# Patient Record
Sex: FEMALE | Race: WHITE | NOT HISPANIC OR LATINO
[De-identification: names, ages, dates, MRNs, and addresses within clinical notes are randomized per-mention and may not be internally consistent; named-entity substitution may affect disease eponyms.]

---

## 2023-01-27 PROBLEM — Z00.00 ENCOUNTER FOR PREVENTIVE HEALTH EXAMINATION: Status: ACTIVE | Noted: 2023-01-27

## 2023-03-17 ENCOUNTER — APPOINTMENT (OUTPATIENT)
Dept: UROGYNECOLOGY | Facility: CLINIC | Age: 63
End: 2023-03-17
Payer: COMMERCIAL

## 2023-03-17 VITALS
HEIGHT: 62 IN | WEIGHT: 153 LBS | BODY MASS INDEX: 28.16 KG/M2 | SYSTOLIC BLOOD PRESSURE: 109 MMHG | DIASTOLIC BLOOD PRESSURE: 74 MMHG | TEMPERATURE: 98.1 F

## 2023-03-17 DIAGNOSIS — R35.1 NOCTURIA: ICD-10-CM

## 2023-03-17 DIAGNOSIS — R35.0 FREQUENCY OF MICTURITION: ICD-10-CM

## 2023-03-17 PROCEDURE — 99205 OFFICE O/P NEW HI 60 MIN: CPT

## 2023-03-17 PROCEDURE — 51798 US URINE CAPACITY MEASURE: CPT

## 2023-03-17 NOTE — LETTER BODY
[Dear  ___] : Dear ~RENE, [I had the pleasure of evaluating your patient, [unfilled]. Thank you for referring Ms. [unfilled] for consultation for ___] : I had the pleasure of evaluating your patient, [unfilled]. Thank you for referring Ms. [unfilled] for consultation for [unfilled]. [Attached please find my note.] : Attached please find my note. [Thank you very much for allowing me to participate in the care of this patient. If you have any questions, please do not hesitate to contact me] : Thank you very much for allowing me to participate in the care of this patient. If you have any questions, please do not hesitate to contact me.

## 2023-03-17 NOTE — DISCUSSION/SUMMARY
[FreeTextEntry1] : I had an extremely long discussion with Tressa today re: vLS, vulvar pain, PFD and urinary symptoms, dyspareunia, GSM. I reviewed the pathologies, possible etiologies, diagnosis, symptoms and treatment options available. Written information was given to the patient.\par \par Continue premarin as using BIW. Apply ftp amt to introitus hs x 5 nights a week (the nights not using PV). Amount and location of cream application was demonstrated to patient today in office via mirror demonstration. I explained that the cream does not work overnight and she needs to continue it for a minimum of 6-8 weeks before we re-evaluate efficacy.\par \par Clobetasol 0.05% ointment pea-size amt to areas of vLS after bath/shower QD x 6 weeks then decrease to QOD x 6 weeks then decrease to BIW. Amount and location of ointment application was demonstrated to patient today in office via mirror demonstration. I explained need for lifelong use of topical steroid for the treatment of vLS. ISSVD patient handout on vLS given to Tressa. \par \par Vulvar health techniques rev'd in detail:\par -Warm baths x15-20 mins QD with Aveeno oatmeal\par -Unscented soaps, body washes, bath gels\par -No fabric softeners or dryer sheets on underwear\par -Unscented pads (NOT ALWAYS)\par -Ice to vulva\par -Hypoallergenic lubricants\par -White Crisco as needed\par -May use Vaseline \par \par V5 suppositories PV qhs. Tressa understands that benzodiazepines are a CS, even in suppository form. She understands the potential for dependence/abuse, drug tolerance, potential for addiction. She understands that this class of drugs is habit-forming and MAGALY regulated. The sedative effects of benzos can be potentiated by taking alcohol, sleeping pills, opioids. The decision to drive is the patient's responsibility, as benzos can affect her driving ability and ability to concentrate. The goal is to wean off benzos. The patient verbalized understanding of everything we discussed and would like to continue with medication at this time. ISTOP checked. She also understands that in order to continue to get refills on her diazepam prescription, she needs to be seen in the office at minimum every 3 months.\par \par PFM relaxation techniques rev'd in detail:\par -Warm baths x15-20 mins QD with 2 cups Aveeno oatmeal\par -No pushing, straining\par -Avoid constipation\par       *Flax seed oil capsules; 2-3 capsules 2-3x/day (titrate as tolerated)\par       *Miralax\par       *Increase water intake\par -No Kegels, no squeezing\par \par Continue PFPT with [facility] weekly\par Continue dilation therapy at home.\par \par PVR = 10ml\par \par I rev'd with Tressa behavioral changes to reduce her frequency and nocturia:\par Keep total Fluids to 64oz per day\par ·6-8oz coffee\par ·No flavoring (i.e.: crystal light, sugar free added flavoring, iced tea)\par ·No seltzer or Pelligrino\par ·Drink slowly throughout the day; NO BINGE DRINKING!\par ·Stop eating and drinking 2-3 hours before bedtime (sips ok)\par \par Avoid coffee, tea, iced tea, alcohol, carbonation (soda, seltzer), spicy foods, tomatoes, hot peppers, citrus, artificial sweeteners, chocolate, soy\par \par RTO 2-3 months\par \par \par \par \par

## 2023-03-17 NOTE — HISTORY OF PRESENT ILLNESS
[FreeTextEntry1] : Tressa is a 61 yo F referred by [facility] for PFD, DYSPAREUNIA, VULVAR PAIN, AND vLS. \par \par Going to [facility] for a couple of months for pelvic and vulvar pain.\par Not able to have intercourse with her  for "years".\par 9/22 she has excision of vulvar scar band at posterior fourchette for vaginal stenosis and to confirm vLS. Path showed benign squamous mucosa with derma fibrosis, hyperkeratosis and focal parakeratosis. \par Was given clobetasol but not using it now. Used 5x/week x 4 weeks then stopped. \par No external vulvar symptoms.\par Uses Premarin PV 2x/week for the past 3 years.\par \par Pain started approximately 25 years ago.\par Had a difficult time inserting tampon.\par From first time she attempted intercourse, unable to insert her 's penis fully or without pain.\par Able to dilate with increasing size dilators. Her pain with dilation is getting progressively better. \par Tried intercourse a couple of months ago, not successful. \par \par Menopause 2011 had ALF/BSO for fibroids. \par Pain stayed the same afterwards.\par Paps wnl\par Mammograms wnl\par No recurrent vaginitis.\par \par Frequency q 1-2 hours (not commensurate with amount of fluids), nocturia x1, no hematuria, no hesitancy, feeling of complete bladder emptying, no RUTIs, rare BRIANNA (with full bladder). She does not wear pads during the day.\par She denies UTI symptoms today. \par She denies all urinary symptoms today.\par Non-smoker. \par \par +constipation has colonoscopy in May.\par \par Supraventricular tachycardia\par \par \par

## 2023-03-17 NOTE — PHYSICAL EXAM
[No Acute Distress] : in no acute distress [Well developed] : well developed [Well Nourished] : ~L well nourished [Good Hygeine] : demonstrates good hygeine [Oriented x3] : oriented to person, place, and time [Normal Memory] : ~T memory was ~L unimpaired [Normal Mood/Affect] : mood and affect are normal [Anxiety] : patient is anxious [Warm and Dry] : was warm and dry to touch [Normal Gait] : gait was normal [Vulvar Atrophy] : vulvar atrophy [Atrophy] : atrophy [No Bleeding] : there was no active vaginal bleeding [Normal] : no abnormalities [Post Void Residual ____ml] : post void residual was [unfilled] ml [Exam Deferred] : was deferred [Chaperone Present] : A chaperone was present in the examining room during all aspects of the physical examination [FreeTextEntry1] : WENDI Schafer [Tenderness] : ~T no ~M abdominal tenderness observed [Distended] : not distended [de-identified] : Q-tip touch test 3/10 @ 5,6,7 with reactive erythema at posterior fourchette, +paleness at anterior fourchette. No fissures, ulcerations, erosions [de-identified] : +hyperkeratosis b/l labia majora with hypopigmentation; +hypopigmentation and complete loss of architecture b/l labia minora; parchment paper-like texture to vulva; 90% phimosis of clitoral varma with midline fusion; hypopigmentation of perineum extending around anus. [FreeTextEntry4] : PFMs 2-3/4 (L>R) with MTrPs b/l levators, all groups and b/l OI with +taut bands and pain to palpatoin

## 2023-03-28 RX ORDER — CLOBETASOL PROPIONATE 0.5 MG/G
0.05 OINTMENT TOPICAL
Qty: 1 | Refills: 0 | Status: ACTIVE | COMMUNITY
Start: 2023-03-28 | End: 1900-01-01

## 2023-06-08 ENCOUNTER — APPOINTMENT (OUTPATIENT)
Dept: UROGYNECOLOGY | Facility: CLINIC | Age: 63
End: 2023-06-08
Payer: COMMERCIAL

## 2023-06-08 VITALS
OXYGEN SATURATION: 99 % | SYSTOLIC BLOOD PRESSURE: 108 MMHG | WEIGHT: 153 LBS | HEART RATE: 65 BPM | HEIGHT: 62 IN | DIASTOLIC BLOOD PRESSURE: 74 MMHG | BODY MASS INDEX: 28.16 KG/M2

## 2023-06-08 DIAGNOSIS — N90.4 LEUKOPLAKIA OF VULVA: ICD-10-CM

## 2023-06-08 DIAGNOSIS — M79.18 MYALGIA, OTHER SITE: ICD-10-CM

## 2023-06-08 DIAGNOSIS — R10.2 PELVIC AND PERINEAL PAIN: ICD-10-CM

## 2023-06-08 DIAGNOSIS — G89.29 PELVIC AND PERINEAL PAIN: ICD-10-CM

## 2023-06-08 DIAGNOSIS — N95.8 OTHER SPECIFIED MENOPAUSAL AND PERIMENOPAUSAL DISORDERS: ICD-10-CM

## 2023-06-08 DIAGNOSIS — N94.10 UNSPECIFIED DYSPAREUNIA: ICD-10-CM

## 2023-06-08 PROCEDURE — 99214 OFFICE O/P EST MOD 30 MIN: CPT

## 2023-06-08 NOTE — DISCUSSION/SUMMARY
[FreeTextEntry1] : d/c premarin ftp to introitus 5x/week.\par Continue premarin PV 2x/week as per GYN.\par ETG10% topical amt to introitus hs 5x/week. \par Continue V5 suppositories PV qhs.\par Continue clobetasol 0.05% ointment to areas vLS 2x/week. \par Continue PFPT with [facility] every 2-3 weeks.\par Continue IM/MFR, stretching.\par Continue dilation therapy 3-4x/week.\par \par OK to attempt intercourse. Comfort measures discussed at length:\par Prior to intercourse:\par -Dilate x 3-5 minutes\par -t/c topical lidocaine prior to attempted intercourse\par \par During intercourse:\par -Uberlube or water-based hypoallergenic lubricant\par -Limit thrusting time\par -Circular motion\par -Side-lying, female-on-top position (if F-M intercourse)\par \par After intercourse:\par -Ice\par -Apply cream to vulva\par \par RTO 2-3 months\par

## 2023-06-08 NOTE — HISTORY OF PRESENT ILLNESS
[FreeTextEntry1] : Tressa is here for a f/u visit.\par Endorses decreased pelvic and vulvar pain.\jessica Has increased dilator size to 4in circumference.\par Attempted intercourse with her  and he was able to insert approx 1/2 his penis. Still unable to achieve full penetration.\par Using clobetasol 2x/week to area of vLS. She endorses improvement in itching/burning in areas of her vLS.\par Using V5 suppositories PV qhs.\par She increased premarin to ftp to introitus 5x/week but for the past 6 weeks only using 2x/week.\par She is still going to [place] for PFPT every 2-3 weeks due to insurance restrictions.\par She reports improvement in her urinary frequency.\par She denies urinary issues today. No symptoms of UTI.\par She denies vaginal discharge, odor, itching.

## 2023-06-08 NOTE — PHYSICAL EXAM
[No Acute Distress] : in no acute distress [Well developed] : well developed [Well Nourished] : ~L well nourished [Good Hygeine] : demonstrates good hygeine [Oriented x3] : oriented to person, place, and time [Normal Memory] : ~T memory was ~L unimpaired [Normal Mood/Affect] : mood and affect are normal [Anxiety] : patient is anxious [Warm and Dry] : was warm and dry to touch [Normal Gait] : gait was normal [Vulvar Atrophy] : vulvar atrophy [Atrophy] : atrophy [No Bleeding] : there was no active vaginal bleeding [Normal] : no abnormalities [Exam Deferred] : was deferred [Tenderness] : ~T no ~M abdominal tenderness observed [Distended] : not distended [de-identified] : Q-tip touch test 3/10 @ 5,6,7 with reactive erythema at posterior fourchette, +paleness at anterior fourchette. No fissures, ulcerations, erosions [de-identified] : +hyperkeratosis b/l labia majora with hypopigmentation; +hypopigmentation and complete loss of architecture b/l labia minora; parchment paper-like texture to vulva; 90% phimosis of clitoral varma with midline fusion; hypopigmentation of perineum extending around anus. [FreeTextEntry4] : PFMs 2/4 (L>R) with MTrPs b/l L IC and b/l  OI with +taut bands and pain to palpation

## 2023-06-13 ENCOUNTER — INPATIENT (INPATIENT)
Facility: HOSPITAL | Age: 63
LOS: 1 days | Discharge: HOME | DRG: 309 | End: 2023-06-15
Attending: INTERNAL MEDICINE | Admitting: INTERNAL MEDICINE
Payer: COMMERCIAL

## 2023-06-13 VITALS
SYSTOLIC BLOOD PRESSURE: 190 MMHG | DIASTOLIC BLOOD PRESSURE: 101 MMHG | RESPIRATION RATE: 20 BRPM | OXYGEN SATURATION: 100 % | HEART RATE: 63 BPM

## 2023-06-13 DIAGNOSIS — F41.9 ANXIETY DISORDER, UNSPECIFIED: ICD-10-CM

## 2023-06-13 DIAGNOSIS — Z98.890 OTHER SPECIFIED POSTPROCEDURAL STATES: Chronic | ICD-10-CM

## 2023-06-13 DIAGNOSIS — R74.8 ABNORMAL LEVELS OF OTHER SERUM ENZYMES: ICD-10-CM

## 2023-06-13 DIAGNOSIS — Z86.2 PERSONAL HISTORY OF DISEASES OF THE BLOOD AND BLOOD-FORMING ORGANS AND CERTAIN DISORDERS INVOLVING THE IMMUNE MECHANISM: ICD-10-CM

## 2023-06-13 DIAGNOSIS — Z90.710 ACQUIRED ABSENCE OF BOTH CERVIX AND UTERUS: Chronic | ICD-10-CM

## 2023-06-13 DIAGNOSIS — I47.1 SUPRAVENTRICULAR TACHYCARDIA: ICD-10-CM

## 2023-06-13 LAB
ALBUMIN SERPL ELPH-MCNC: 4.3 G/DL — SIGNIFICANT CHANGE UP (ref 3.3–5)
ALP SERPL-CCNC: 43 U/L — SIGNIFICANT CHANGE UP (ref 40–120)
ALT FLD-CCNC: 22 U/L — SIGNIFICANT CHANGE UP (ref 10–45)
ANION GAP SERPL CALC-SCNC: 13 MMOL/L — SIGNIFICANT CHANGE UP (ref 5–17)
ANISOCYTOSIS BLD QL: SLIGHT — SIGNIFICANT CHANGE UP
AST SERPL-CCNC: 28 U/L — SIGNIFICANT CHANGE UP (ref 10–40)
BASOPHILS # BLD AUTO: 0 K/UL — SIGNIFICANT CHANGE UP (ref 0–0.2)
BASOPHILS NFR BLD AUTO: 0 % — SIGNIFICANT CHANGE UP (ref 0–2)
BILIRUB SERPL-MCNC: 0.7 MG/DL — SIGNIFICANT CHANGE UP (ref 0.2–1.2)
BUN SERPL-MCNC: 18 MG/DL — SIGNIFICANT CHANGE UP (ref 7–23)
CALCIUM SERPL-MCNC: 8.8 MG/DL — SIGNIFICANT CHANGE UP (ref 8.4–10.5)
CHLORIDE SERPL-SCNC: 99 MMOL/L — SIGNIFICANT CHANGE UP (ref 96–108)
CK MB CFR SERPL CALC: 3.5 NG/ML — SIGNIFICANT CHANGE UP (ref 0–3.8)
CK SERPL-CCNC: 64 U/L — SIGNIFICANT CHANGE UP (ref 25–170)
CO2 SERPL-SCNC: 22 MMOL/L — SIGNIFICANT CHANGE UP (ref 22–31)
CREAT SERPL-MCNC: 1.06 MG/DL — SIGNIFICANT CHANGE UP (ref 0.5–1.3)
EGFR: 59 ML/MIN/1.73M2 — LOW
ELLIPTOCYTES BLD QL SMEAR: SLIGHT — SIGNIFICANT CHANGE UP
EOSINOPHIL # BLD AUTO: 0 K/UL — SIGNIFICANT CHANGE UP (ref 0–0.5)
EOSINOPHIL NFR BLD AUTO: 0 % — SIGNIFICANT CHANGE UP (ref 0–6)
GLUCOSE SERPL-MCNC: 94 MG/DL — SIGNIFICANT CHANGE UP (ref 70–99)
HCT VFR BLD CALC: 31.2 % — LOW (ref 34.5–45)
HGB BLD-MCNC: 10.1 G/DL — LOW (ref 11.5–15.5)
HYPOCHROMIA BLD QL: SLIGHT — SIGNIFICANT CHANGE UP
LYMPHOCYTES # BLD AUTO: 2.23 K/UL — SIGNIFICANT CHANGE UP (ref 1–3.3)
LYMPHOCYTES # BLD AUTO: 35.7 % — SIGNIFICANT CHANGE UP (ref 13–44)
MACROCYTES BLD QL: SLIGHT — SIGNIFICANT CHANGE UP
MANUAL SMEAR VERIFICATION: SIGNIFICANT CHANGE UP
MCHC RBC-ENTMCNC: 21.1 PG — LOW (ref 27–34)
MCHC RBC-ENTMCNC: 32.4 GM/DL — SIGNIFICANT CHANGE UP (ref 32–36)
MCV RBC AUTO: 65.1 FL — LOW (ref 80–100)
MICROCYTES BLD QL: SIGNIFICANT CHANGE UP
MONOCYTES # BLD AUTO: 0.16 K/UL — SIGNIFICANT CHANGE UP (ref 0–0.9)
MONOCYTES NFR BLD AUTO: 2.6 % — SIGNIFICANT CHANGE UP (ref 2–14)
NEUTROPHILS # BLD AUTO: 3.86 K/UL — SIGNIFICANT CHANGE UP (ref 1.8–7.4)
NEUTROPHILS NFR BLD AUTO: 61.7 % — SIGNIFICANT CHANGE UP (ref 43–77)
NT-PROBNP SERPL-SCNC: 166 PG/ML — SIGNIFICANT CHANGE UP (ref 0–300)
PLAT MORPH BLD: NORMAL — SIGNIFICANT CHANGE UP
PLATELET # BLD AUTO: 275 K/UL — SIGNIFICANT CHANGE UP (ref 150–400)
POLYCHROMASIA BLD QL SMEAR: SLIGHT — SIGNIFICANT CHANGE UP
POTASSIUM SERPL-MCNC: 4.3 MMOL/L — SIGNIFICANT CHANGE UP (ref 3.5–5.3)
POTASSIUM SERPL-SCNC: 4.3 MMOL/L — SIGNIFICANT CHANGE UP (ref 3.5–5.3)
PROT SERPL-MCNC: 6.3 G/DL — SIGNIFICANT CHANGE UP (ref 6–8.3)
RBC # BLD: 4.79 M/UL — SIGNIFICANT CHANGE UP (ref 3.8–5.2)
RBC # FLD: 16.5 % — HIGH (ref 10.3–14.5)
RBC BLD AUTO: ABNORMAL
SCHISTOCYTES BLD QL AUTO: SLIGHT — SIGNIFICANT CHANGE UP
SODIUM SERPL-SCNC: 134 MMOL/L — LOW (ref 135–145)
TARGETS BLD QL SMEAR: SLIGHT — SIGNIFICANT CHANGE UP
TROPONIN T, HIGH SENSITIVITY RESULT: 122 NG/L — HIGH (ref 0–51)
TROPONIN T, HIGH SENSITIVITY RESULT: 16 NG/L — SIGNIFICANT CHANGE UP (ref 0–51)
TROPONIN T, HIGH SENSITIVITY RESULT: 72 NG/L — HIGH (ref 0–51)
WBC # BLD: 6.26 K/UL — SIGNIFICANT CHANGE UP (ref 3.8–10.5)
WBC # FLD AUTO: 6.26 K/UL — SIGNIFICANT CHANGE UP (ref 3.8–10.5)

## 2023-06-13 PROCEDURE — 71045 X-RAY EXAM CHEST 1 VIEW: CPT | Mod: 26

## 2023-06-13 PROCEDURE — 99223 1ST HOSP IP/OBS HIGH 75: CPT

## 2023-06-13 PROCEDURE — 93308 TTE F-UP OR LMTD: CPT | Mod: 26

## 2023-06-13 RX ORDER — ONDANSETRON 8 MG/1
4 TABLET, FILM COATED ORAL EVERY 8 HOURS
Refills: 0 | Status: DISCONTINUED | OUTPATIENT
Start: 2023-06-13 | End: 2023-06-15

## 2023-06-13 RX ORDER — ARIPIPRAZOLE 15 MG/1
10 TABLET ORAL DAILY
Refills: 0 | Status: DISCONTINUED | OUTPATIENT
Start: 2023-06-13 | End: 2023-06-15

## 2023-06-13 RX ORDER — LANOLIN ALCOHOL/MO/W.PET/CERES
3 CREAM (GRAM) TOPICAL AT BEDTIME
Refills: 0 | Status: DISCONTINUED | OUTPATIENT
Start: 2023-06-13 | End: 2023-06-15

## 2023-06-13 RX ORDER — ACETAMINOPHEN 500 MG
650 TABLET ORAL EVERY 6 HOURS
Refills: 0 | Status: DISCONTINUED | OUTPATIENT
Start: 2023-06-13 | End: 2023-06-15

## 2023-06-13 RX ORDER — CITALOPRAM 10 MG/1
40 TABLET, FILM COATED ORAL DAILY
Refills: 0 | Status: DISCONTINUED | OUTPATIENT
Start: 2023-06-13 | End: 2023-06-15

## 2023-06-13 RX ORDER — CLONAZEPAM 1 MG
0.5 TABLET ORAL
Refills: 0 | Status: DISCONTINUED | OUTPATIENT
Start: 2023-06-13 | End: 2023-06-15

## 2023-06-13 NOTE — H&P ADULT - NSHPLABSRESULTS_GEN_ALL_CORE
Labs personally reviewed:                          10.1   6.26  )-----------( 275      ( 13 Jun 2023 16:57 )             31.2     06-13    134<L>  |  99  |  18  ----------------------------<  94  4.3   |  22  |  1.06    Ca    8.8      13 Jun 2023 16:57    TPro  6.3  /  Alb  4.3  /  TBili  0.7  /  DBili  x   /  AST  28  /  ALT  22  /  AlkPhos  43  06-13        LIVER FUNCTIONS - ( 13 Jun 2023 16:57 )  Alb: 4.3 g/dL / Pro: 6.3 g/dL / ALK PHOS: 43 U/L / ALT: 22 U/L / AST: 28 U/L / GGT: x               CAPILLARY BLOOD GLUCOSE          Imaging:  CXR personally reviewed: no focal opacity    EKG personally reviewed: nsr at 79 bpm , no acute st changes Labs personally reviewed:                          10.1   6.26  )-----------( 275      ( 13 Jun 2023 16:57 )             31.2     06-13    134<L>  |  99  |  18  ----------------------------<  94  4.3   |  22  |  1.06    Ca    8.8      13 Jun 2023 16:57    TPro  6.3  /  Alb  4.3  /  TBili  0.7  /  DBili  x   /  AST  28  /  ALT  22  /  AlkPhos  43  06-13        LIVER FUNCTIONS - ( 13 Jun 2023 16:57 )  Alb: 4.3 g/dL / Pro: 6.3 g/dL / ALK PHOS: 43 U/L / ALT: 22 U/L / AST: 28 U/L / GGT: x               CAPILLARY BLOOD GLUCOSE          Imaging:  CXR personally reviewed: no focal opacity    EKG personally reviewed: nsr at 79 bpm , no acute st changes, qtc 431

## 2023-06-13 NOTE — PATIENT PROFILE ADULT - ...
Condom Catheter removed and Henson Catheter inserted without difficulty using sterile technique. Urinary catheter is patent and flowing. Patient tolerated well.   Continue to monitor 13-Jun-2023 22:17:05

## 2023-06-13 NOTE — ED PROVIDER NOTE - PHYSICAL EXAMINATION
On exam patient is well-appearing, skin warm and well perfused, no bilateral lower extremity edema, heart rate regular, clear lungs.

## 2023-06-13 NOTE — H&P ADULT - PROBLEM SELECTOR PLAN 1
initially w/ hemodynamic instability , now improved , currently in sinus , hemodynamically stable, mild troponin elevated , more likely 2/2 increased demand rather than ischemic event    - telemetry   - TTE   - TSH   - check orthostatic vitals   - f/u cardiology consult   - EP consult -to be arranged by day team

## 2023-06-13 NOTE — ED PROVIDER NOTE - OBJECTIVE STATEMENT
Patient is a 62-year-old female past medical history of SVT with as needed propanolol, presenting with palpitations and near syncope.  Patient states she was sitting at her desk at work and she was texting her  when she started to have palpitations that felt like her SVT, she then started to feel faint and took 20 of propanolol which did not help, she then took another 20 of propanolol which again did not help.She then called her doctor's office and was told to take 1 more propanolol.   EMS then came and found her with a blood pressure of 70/40, blood pressure did not resolve after 500 mL of fluid, so they called for medical advice and was told to start a Levo drip, by the time she came to the ER her blood pressure was very high.  Levo was stopped in ED.  Blood pressure was then 90/60.  Patient symptoms have resolved on arrival, with heart rate now in the 60s.  Patient notes she had some shortness of breath during the incident, felt faint, and had some chest pain on arrival to the ED.  Denies recent illness, fevers, weakness, numbness, syncope.

## 2023-06-13 NOTE — H&P ADULT - NSHPPHYSICALEXAM_GEN_ALL_CORE
Vital Signs Last 24 Hrs  T(C): 37 (13 Jun 2023 18:15), Max: 37 (13 Jun 2023 18:15)  T(F): 98.6 (13 Jun 2023 18:15), Max: 98.6 (13 Jun 2023 18:15)  HR: 54 (13 Jun 2023 20:44) (54 - 80)  BP: 107/73 (13 Jun 2023 20:44) (87/58 - 190/101)  BP(mean): 71 (13 Jun 2023 17:15) (71 - 71)  RR: 16 (13 Jun 2023 20:44) (16 - 20)  SpO2: 99% (13 Jun 2023 20:44) (98% - 100%)    Parameters below as of 13 Jun 2023 20:44  Patient On (Oxygen Delivery Method): room air Vital Signs Last 24 Hrs  T(C): 37 (13 Jun 2023 18:15), Max: 37 (13 Jun 2023 18:15)  T(F): 98.6 (13 Jun 2023 18:15), Max: 98.6 (13 Jun 2023 18:15)  HR: 54 (13 Jun 2023 20:44) (54 - 80)  BP: 107/73 (13 Jun 2023 20:44) (87/58 - 190/101)  BP(mean): 71 (13 Jun 2023 17:15) (71 - 71)  RR: 16 (13 Jun 2023 20:44) (16 - 20)  SpO2: 99% (13 Jun 2023 20:44) (98% - 100%)    Parameters below as of 13 Jun 2023 20:44  Patient On (Oxygen Delivery Method): room air    GENERAL: No acute distress, well-developed  HEAD:  Atraumatic, Normocephalic  ENT: EOMI, PERRLA, conjunctiva and sclera clear,  moist mucosa no pharyngeal erythema or exudates   NECK: supple , no JVD   CHEST/LUNG: Clear to auscultation bilaterally; No wheeze, equal breath sounds bilaterally   BACK: No spinal tenderness,  No CVA tenderness   HEART: Regular rate and rhythm; No murmurs, rubs, or gallops  ABDOMEN: Soft, Nontender, Nondistended; Bowel sounds present  EXTREMITIES:  No clubbing, cyanosis, or edema  MSK: No joint swelling or effusions, ROM intact   PSYCH: Normal behavior/affect  NEUROLOGY: AAOx3, non-focal, cranial nerves intact  SKIN: Normal color, No rashes or lesions

## 2023-06-13 NOTE — ED PROVIDER NOTE - PROGRESS NOTE DETAILS
Joleen Glover MD PGY2: Spoke with Dr Moreno. Effusion is new. Will admit to [facility] for cardiology work-up. Patient currently hemodynamically stable. [facility] to see.

## 2023-06-13 NOTE — ED ADULT NURSE NOTE - NSFALLUNIVINTERV_ED_ALL_ED
Bed/Stretcher in lowest position, wheels locked, appropriate side rails in place/Call bell, personal items and telephone in reach/Instruct patient to call for assistance before getting out of bed/chair/stretcher/Non-slip footwear applied when patient is off stretcher/[place] to call system/Physically safe environment - no spills, clutter or unnecessary equipment/Purposeful proactive rounding/Room/bathroom lighting operational, light cord in reach

## 2023-06-13 NOTE — CONSULT NOTE ADULT - SUBJECTIVE AND OBJECTIVE BOX
DATE OF SERVICE: 06-13-23     CHIEF COMPLAINT:Patient is a 62y old  Female who presents with a chief complaint of palpitations x 1 day (13 Jun 2023 20:50)      HISTORY OF PRESENT ILLNESS:HPI:  Patient is a 62-year-old female with a past medical history of SVT  on propranolol as needed ,  anxiety and depression , thalassemia minor, presenting for  palpitations and lightheadedness for one day.   Patient sudden onset palpitations while at work on day of admission , symptoms were associated with lightheadedness and feeling as if she was going to pass out, she also reports some shortness of breath and chest pain , she took of 20mg of propranolol three times with minimal response.  Patient was found to be hypotensive upon EMS arrival and was treated with IV fluid bolus . She was placed transiently on Levophed with improvement of symptoms. Patient reports no recent illness , no fever or chills. no recent travel , no sick contacts, no lower extremity edema. Sympotms have since resolved and patient has no specific complaints at this time.    (13 Jun 2023 20:50)      PAST MEDICAL & SURGICAL HISTORY:  Supraventricular tachycardia      Anxiety and depression      H/O thalassemia minor      S/P hysterectomy      H/O knee surgery              MEDICATIONS:        acetaminophen     Tablet .. 650 milliGRAM(s) Oral every 6 hours PRN  ARIPiprazole 10 milliGRAM(s) Oral daily  citalopram 40 milliGRAM(s) Oral daily  clonazePAM  Tablet 0.5 milliGRAM(s) Oral two times a day PRN  melatonin 3 milliGRAM(s) Oral at bedtime PRN  ondansetron Injectable 4 milliGRAM(s) IV Push every 8 hours PRN    aluminum hydroxide/magnesium hydroxide/simethicone Suspension 30 milliLiter(s) Oral every 4 hours PRN          FAMILY HISTORY:  Family history of angina (Grandparent)        Non-contributory    SOCIAL HISTORY:    [ ] not a smoker    Allergies    latex (Unknown)  No Known Drug Allergies    Intolerances    	    REVIEW OF SYSTEMS:  CONSTITUTIONAL: No fever  EYES: No eye pain, visual disturbances, or discharge  ENMT:  No difficulty hearing, tinnitus  NECK: No pain or stiffness  RESPIRATORY: No cough, wheezing,  CARDIOVASCULAR: No chest pain, + palpitations, no passing out, dizziness, or leg swelling  GASTROINTESTINAL:  No nausea, vomiting, diarrhea or constipation. No melena.  GENITOURINARY: No dysuria, hematuria  NEUROLOGICAL: No stroke like symptoms  SKIN: No burning or lesions   ENDOCRINE: No heat or cold intolerance  MUSCULOSKELETAL: No joint pain or swelling  PSYCHIATRIC: No  anxiety, mood swings  HEME/LYMPH: No bleeding gums  ALLERGY AND IMMUNOLOGIC: No hives or eczema	    All other ROS negative    PHYSICAL EXAM:  T(C): 37 (06-13-23 @ 18:15), Max: 37 (06-13-23 @ 18:15)  HR: 69 (06-13-23 @ 21:23) (54 - 80)  BP: 115/75 (06-13-23 @ 21:23) (87/58 - 190/101)  RR: 17 (06-13-23 @ 21:23) (16 - 20)  SpO2: 100% (06-13-23 @ 21:23) (98% - 100%)  Wt(kg): --  I&O's Summary      Appearance: Normal	  HEENT:   Normal oral mucosa, EOMI	  Cardiovascular:  S1 S2, No JVD,    Respiratory: Lungs clear to auscultation	  Psychiatry: Alert  Gastrointestinal:  Soft, Non-tender, + BS	  Skin: No rashes   Neurologic: Non-focal  Extremities:  No edema  Vascular: Peripheral pulses palpable    	    	  	  CARDIAC MARKERS:  Labs personally reviewed by me                                  10.1   6.26  )-----------( 275      ( 13 Jun 2023 16:57 )             31.2     06-13    134<L>  |  99  |  18  ----------------------------<  94  4.3   |  22  |  1.06    Ca    8.8      13 Jun 2023 16:57    TPro  6.3  /  Alb  4.3  /  TBili  0.7  /  DBili  x   /  AST  28  /  ALT  22  /  AlkPhos  43  06-13          EKG: Personally reviewed by me - NSR  Radiology: Personally reviewed by me - CXR clear lungs      Assessment:  · Assessment	  62 F w/pmh SVT, anxiety and depression , thalassemia minor, p/w palpitations x 1 day         Problem/Plan - 1:  ·  Problem: Sustained SVT.   ·  Plan: initially w/ hemodynamic instability , now improved , currently in sinus , hemodynamically stable,    - telemetry   - TTE   - TSH   - check orthostatic vitals   - OP follow with EP for ablation eval    Problem/Plan - 2:  ·  Problem: Elevated troponin  ·  Plan: Likely increased demand in setting of SVT , currently asymptomatic , ekg w/ no acute ischemic changes   - telemetry   - as1/ lipid   - CT angio heart to define anatomy    Problem/Plan - 3:  ·  Problem: Anxiety and depression.   ·  Plan: - continue Celexa   - continue Abilify   - continue Klonopin prn.    Problem/Plan - 4:  ·  Problem: H/O thalassemia minor.   ·  Plan: hgb 10 , at baseline per patient   - monitor H/H.                Differential diagnosis and plan of care discussed with patient after the evaluation. Counseling on diet, nutritional counseling, weight management, exercise and medication compliance was done.   Advanced care planning/advanced directives discussed with patient/family. DNR status including forceful chest compressions to attempt to restart the heart, ventilator support/artificial breathing, electric shock, artificial nutrition, health care proxy, Molst form all discussed with pt. Pt wishes to consider. More than fifteen minutes spent on discussing advanced directives. One hundred ten minutes spent on encounter, of which more than fifty percent of the encounter was spent on counseling and/or coordinating care by the attending physician.        Antoine Noriega DO [facility]  Cardiovascular Medicine  [address] [place] [address], [address]  Office [phone]  Available via call/text on [employer] Teams

## 2023-06-13 NOTE — ED PROVIDER NOTE - ATTENDING CONTRIBUTION TO CARE
Attending MD Fregoso:  I personally have seen and examined this patient. I have performed a substantive portion of the visit including all aspects of the medical decision making.  Resident note reviewed and agree on plan of care and except where noted.      62-year-old woman with a history of SVT intermittently taking propranolol as needed for symptoms presenting with EMS for palpitations near syncope and hypotension.  Reportedly patient had blood pressure rené of 70 systolic.  The patient was given 500 cc of fluid and then started on a norepinephrine drip briefly.  At this time she feels overall better her heart rate is in the 70s, norepinephrine was stopped and blood pressure was 80s to 90s systolic.  Denies any chest pain or abdominal pain.  No preceding illnesses.    Patient sitting in the stretcher in no apparent distress.  No increased work of breathing.  Regular heart sounds clear lungs anteriorly.  Extremities warm and well perfused peripheral pulses palpable no appreciable edema.    ED POCUS reveals small to moderate pericardial effusion.  No appreciable right atrial systolic collapse, no appreciable right ventricular diastolic collapse.  The IVC is plethoric with minimal respiratory variation however.  Unable to obtain adequate images in the apical windows for mitral inflow variation calculation.  Overall no overt signs of cardiac tamponade on echo.    Patient presenting for evaluation of palpitations and near syncope with hypotension.  History of SVT.  Patient is not in SVT currently.  Possibly new onset small to moderate pericardial effusion of uncertain significance.  Given presenting hypotension however, will need comprehensive echo telemetry monitoring cardiology consultation.  ED POCUS echo is without obvious tamponade physiology at this time.            *The above represents an initial assessment/impression. Please refer to progress notes for potential changes in patient clinical course*

## 2023-06-13 NOTE — H&P ADULT - NSICDXFAMILYHX_GEN_ALL_CORE_FT
FAMILY HISTORY:  Grandparent  Still living? Unknown  Family history of angina, Age at diagnosis: Age Unknown

## 2023-06-13 NOTE — ED PROVIDER NOTE - CLINICAL SUMMARY MEDICAL DECISION MAKING FREE TEXT BOX
Patient is a 62-year-old female past medical history of SVT with as needed propanolol, presenting with palpitations and near syncope. Bedside echo, patient with small to moderate pericardial effusion, and plethoric IVC. Initial EKG by EMS shows SVT at rate 150. No bilateral lower extremity edema.  Patient appears comfortable.  Vital stable at this time will place on cardiac monitor.  We will get chest x-ray, Trope, BNP, labs.  Patient will need to stay in hospital for cardiac evaluation.  We will try to call Dr. Moreno patient's cardiologist for further information.  Patient is unaware if the pericardial effusion is old or new.  Has never had an ablation for the SVT.

## 2023-06-13 NOTE — H&P ADULT - NSHPREVIEWOFSYSTEMS_GEN_ALL_CORE
CONSTITUTIONAL: +  weakness,no  fevers or chills  EYES/ENT: No visual changes;  No dysphagia  NECK: No pain or stiffness  RESPIRATORY: No cough, wheezing, hemoptysis; +  shortness of breath  CARDIOVASCULAR: +  chest pain + palpitations; No lower extremity edema  EXTREMITIES: no le edema, cyanosis, clubbing  GASTROINTESTINAL: No abdominal or epigastric pain. No nausea, vomiting, or hematemesis; No diarrhea or constipation. No melena or hematochezia.  BACK: No back pain  GENITOURINARY: No dysuria, frequency or hematuria  NEUROLOGICAL: No numbness or weakness  MSK: no joint swelling or pain  SKIN: No itching, burning, rashes, or lesions   PSYCH: no agitation  All other review of systems is negative unless indicated above.

## 2023-06-13 NOTE — H&P ADULT - HISTORY OF PRESENT ILLNESS
Patient is a 62-year-old female with a past medical history of SVT  on propranolol as needed ,  presenting for  palpitations and lightheadedness for one day.   Patient sudden onset palpitations while at work on day of admission , symptoms were associated with lightheadedness and feeling as if she was going to pass out, she also reports some shortness of breath and chest pain , she took of 20mg of propranolol three times with minimal response.  Patient was found to be hypotensive upon EMS arrival and was treated with IV fluid bolus . She was placed transiently on Levophed with improvement of symptoms. Patient reports no recent illness , no fever or chills.    Patient is a 62-year-old female with a past medical history of SVT  on propranolol as needed ,  anxiety and depression , thalassemia minor, presenting for  palpitations and lightheadedness for one day.   Patient sudden onset palpitations while at work on day of admission , symptoms were associated with lightheadedness and feeling as if she was going to pass out, she also reports some shortness of breath and chest pain , she took of 20mg of propranolol three times with minimal response.  Patient was found to be hypotensive upon EMS arrival and was treated with IV fluid bolus . She was placed transiently on Levophed with improvement of symptoms. Patient reports no recent illness , no fever or chills. no recent travel , no sick contacts, no lower extremity edema. Sympotms have since resolved and patient has no specific complaints at this time.

## 2023-06-13 NOTE — ED ADULT NURSE NOTE - OBJECTIVE STATEMENT
Patient is a 62 year old female brought in by EMS from work due to palpitations and near syncope. EMS reports patient was found to have a blood pressure of 75/50 so they gave her NS 500cc bolus and then started a levo drip which they stopped on arrival to the ED. Patient reports sitting at her desk at work and she was texting her  when she started to have palpitations that felt like her SVT, she then started to feel faint and took 20mg of propanolol which did not help. Patient then took another 2 of propanolol in total 60mg. On assessment A&Ox1, ambulates independently, breathing comfortably on room air, NSR on the cardiac monitor, no JVD, no edema noted, abdomen is soft, nondistended, nontender. Patient denies headache, dizziness, chest pain, palpitations, cough, SOB, abdominal pain, n/v/d, urinary symptoms, fevers, chills, weakness at this time. PMH anxiety, SVT.

## 2023-06-13 NOTE — H&P ADULT - PROBLEM SELECTOR PLAN 2
increased demand in setting of SVT , currently asymptomatic , ekg w/ no acute ischemic changes   - telemetry   - as1/ lipid   - trend cardiac enzymes until peak   - f/u cardiology consult

## 2023-06-13 NOTE — H&P ADULT - NSICDXPASTMEDICALHX_GEN_ALL_CORE_FT
PAST MEDICAL HISTORY:  Anxiety and depression     H/O thalassemia minor     Supraventricular tachycardia

## 2023-06-14 LAB
A1C WITH ESTIMATED AVERAGE GLUCOSE RESULT: 4.8 % — SIGNIFICANT CHANGE UP (ref 4–5.6)
ALBUMIN SERPL ELPH-MCNC: 4.1 G/DL — SIGNIFICANT CHANGE UP (ref 3.3–5)
ALP SERPL-CCNC: 39 U/L — LOW (ref 40–120)
ALT FLD-CCNC: 20 U/L — SIGNIFICANT CHANGE UP (ref 10–45)
ANION GAP SERPL CALC-SCNC: 13 MMOL/L — SIGNIFICANT CHANGE UP (ref 5–17)
AST SERPL-CCNC: 20 U/L — SIGNIFICANT CHANGE UP (ref 10–40)
BASOPHILS # BLD AUTO: 0.04 K/UL — SIGNIFICANT CHANGE UP (ref 0–0.2)
BASOPHILS NFR BLD AUTO: 0.7 % — SIGNIFICANT CHANGE UP (ref 0–2)
BILIRUB SERPL-MCNC: 0.4 MG/DL — SIGNIFICANT CHANGE UP (ref 0.2–1.2)
BUN SERPL-MCNC: 18 MG/DL — SIGNIFICANT CHANGE UP (ref 7–23)
CALCIUM SERPL-MCNC: 9 MG/DL — SIGNIFICANT CHANGE UP (ref 8.4–10.5)
CHLORIDE SERPL-SCNC: 103 MMOL/L — SIGNIFICANT CHANGE UP (ref 96–108)
CHOLEST SERPL-MCNC: 102 MG/DL — SIGNIFICANT CHANGE UP
CO2 SERPL-SCNC: 24 MMOL/L — SIGNIFICANT CHANGE UP (ref 22–31)
CREAT SERPL-MCNC: 0.88 MG/DL — SIGNIFICANT CHANGE UP (ref 0.5–1.3)
EGFR: 74 ML/MIN/1.73M2 — SIGNIFICANT CHANGE UP
EOSINOPHIL # BLD AUTO: 0.1 K/UL — SIGNIFICANT CHANGE UP (ref 0–0.5)
EOSINOPHIL NFR BLD AUTO: 1.9 % — SIGNIFICANT CHANGE UP (ref 0–6)
ESTIMATED AVERAGE GLUCOSE: 91 MG/DL — SIGNIFICANT CHANGE UP (ref 68–114)
GLUCOSE SERPL-MCNC: 87 MG/DL — SIGNIFICANT CHANGE UP (ref 70–99)
HCT VFR BLD CALC: 30.3 % — LOW (ref 34.5–45)
HCV AB S/CO SERPL IA: 0.09 S/CO — SIGNIFICANT CHANGE UP (ref 0–0.99)
HCV AB SERPL-IMP: SIGNIFICANT CHANGE UP
HDLC SERPL-MCNC: 38 MG/DL — LOW
HGB BLD-MCNC: 9.9 G/DL — LOW (ref 11.5–15.5)
IMM GRANULOCYTES NFR BLD AUTO: 0.2 % — SIGNIFICANT CHANGE UP (ref 0–0.9)
LIPID PNL WITH DIRECT LDL SERPL: 53 MG/DL — SIGNIFICANT CHANGE UP
LYMPHOCYTES # BLD AUTO: 1.8 K/UL — SIGNIFICANT CHANGE UP (ref 1–3.3)
LYMPHOCYTES # BLD AUTO: 33.7 % — SIGNIFICANT CHANGE UP (ref 13–44)
MCHC RBC-ENTMCNC: 21.2 PG — LOW (ref 27–34)
MCHC RBC-ENTMCNC: 32.7 GM/DL — SIGNIFICANT CHANGE UP (ref 32–36)
MCV RBC AUTO: 64.9 FL — LOW (ref 80–100)
MONOCYTES # BLD AUTO: 0.46 K/UL — SIGNIFICANT CHANGE UP (ref 0–0.9)
MONOCYTES NFR BLD AUTO: 8.6 % — SIGNIFICANT CHANGE UP (ref 2–14)
MRSA PCR RESULT.: SIGNIFICANT CHANGE UP
NEUTROPHILS # BLD AUTO: 2.93 K/UL — SIGNIFICANT CHANGE UP (ref 1.8–7.4)
NEUTROPHILS NFR BLD AUTO: 54.9 % — SIGNIFICANT CHANGE UP (ref 43–77)
NON HDL CHOLESTEROL: 64 MG/DL — SIGNIFICANT CHANGE UP
NRBC # BLD: 0 /100 WBCS — SIGNIFICANT CHANGE UP (ref 0–0)
PLATELET # BLD AUTO: 239 K/UL — SIGNIFICANT CHANGE UP (ref 150–400)
POTASSIUM SERPL-MCNC: 4 MMOL/L — SIGNIFICANT CHANGE UP (ref 3.5–5.3)
POTASSIUM SERPL-SCNC: 4 MMOL/L — SIGNIFICANT CHANGE UP (ref 3.5–5.3)
PROT SERPL-MCNC: 6.1 G/DL — SIGNIFICANT CHANGE UP (ref 6–8.3)
RBC # BLD: 4.67 M/UL — SIGNIFICANT CHANGE UP (ref 3.8–5.2)
RBC # FLD: 16.5 % — HIGH (ref 10.3–14.5)
S AUREUS DNA NOSE QL NAA+PROBE: SIGNIFICANT CHANGE UP
SODIUM SERPL-SCNC: 140 MMOL/L — SIGNIFICANT CHANGE UP (ref 135–145)
TRIGL SERPL-MCNC: 56 MG/DL — SIGNIFICANT CHANGE UP
TROPONIN T, HIGH SENSITIVITY RESULT: 79 NG/L — HIGH (ref 0–51)
TSH SERPL-MCNC: 5.27 UIU/ML — HIGH (ref 0.27–4.2)
WBC # BLD: 5.34 K/UL — SIGNIFICANT CHANGE UP (ref 3.8–10.5)
WBC # FLD AUTO: 5.34 K/UL — SIGNIFICANT CHANGE UP (ref 3.8–10.5)

## 2023-06-14 PROCEDURE — 93356 MYOCRD STRAIN IMG SPCKL TRCK: CPT

## 2023-06-14 PROCEDURE — 93306 TTE W/DOPPLER COMPLETE: CPT | Mod: 26

## 2023-06-14 RX ORDER — CHLORHEXIDINE GLUCONATE 213 G/1000ML
1 SOLUTION TOPICAL
Refills: 0 | Status: DISCONTINUED | OUTPATIENT
Start: 2023-06-14 | End: 2023-06-15

## 2023-06-14 RX ADMIN — CITALOPRAM 40 MILLIGRAM(S): 10 TABLET, FILM COATED ORAL at 12:24

## 2023-06-14 RX ADMIN — Medication 0.5 MILLIGRAM(S): at 22:04

## 2023-06-14 RX ADMIN — ARIPIPRAZOLE 10 MILLIGRAM(S): 15 TABLET ORAL at 12:25

## 2023-06-14 NOTE — PROGRESS NOTE ADULT - NS ATTEND AMEND GEN_ALL_CORE FT
Patient care and plan discussed and reviewed with Advanced Care Provider. Plan as outlined above edited by me to reflect our discussion. I had a prolonged conversation with the patient/son regarding hospital course, differential diagnosis and results of diagnostic tests.  Plan of care discussed with patient/son after the evaluation. Patient/family express clear understanding and satisfaction with the plan of care.  Sixty five minutes spent on encounter, of which more than fifty percent of the encounter was spent on counseling and/or coordinating care by the attending physician.
Pt care and plan discussed and reviewed with PA. Plan as outlined above edited by me to reflect our discussion. Advanced care planning/advanced directives discussed with patient/family. DNR status including forceful chest compressions to attempt to restart the heart, ventilator support/artificial breathing, electric shock, artificial nutrition, health care proxy, Molst form all discussed with pt.

## 2023-06-14 NOTE — PROGRESS NOTE ADULT - SUBJECTIVE AND OBJECTIVE BOX
DATE OF SERVICE: 06-14-23 @ 13:16    Patient is a 62y old  Female who presents with a chief complaint of palpitations x 1 day (13 Jun 2023 20:50)      INTERVAL HISTORY: Feels much better.    REVIEW OF SYSTEMS:  CONSTITUTIONAL: No weakness  EYES/ENT: No visual changes;  No throat pain   NECK: No pain or stiffness  RESPIRATORY: No cough, wheezing; No shortness of breath  CARDIOVASCULAR: No chest pain or palpitations  GASTROINTESTINAL: No abdominal  pain. No nausea, vomiting, or hematemesis  GENITOURINARY: No dysuria, frequency or hematuria  NEUROLOGICAL: No stroke like symptoms  SKIN: No rashes    TELEMETRY Personally reviewed: SB/SR 40-80  	  MEDICATIONS:        PHYSICAL EXAM:  T(C): 36.7 (06-14-23 @ 12:18), Max: 37 (06-13-23 @ 18:15)  HR: 61 (06-14-23 @ 12:18) (54 - 80)  BP: 117/79 (06-14-23 @ 12:18) (87/58 - 190/101)  RR: 18 (06-14-23 @ 12:18) (16 - 20)  SpO2: 97% (06-14-23 @ 12:18) (97% - 100%)  Wt(kg): --  I&O's Summary    13 Jun 2023 07:01  -  14 Jun 2023 07:00  --------------------------------------------------------  IN: 150 mL / OUT: 1 mL / NET: 149 mL          Appearance: In no distress	  HEENT:    PERRL, EOMI	  Cardiovascular:  S1 S2, No JVD  Respiratory: Lungs clear to auscultation	  Gastrointestinal:  Soft, Non-tender, + BS	  Vascularature:  No edema of LE  Psychiatric: Appropriate affect   Neuro: no acute focal deficits                               9.9    5.34  )-----------( 239      ( 14 Jun 2023 06:50 )             30.3     06-14    140  |  103  |  18  ----------------------------<  87  4.0   |  24  |  0.88    Ca    9.0      14 Jun 2023 06:51    TPro  6.1  /  Alb  4.1  /  TBili  0.4  /  DBili  x   /  AST  20  /  ALT  20  /  AlkPhos  39<L>  06-14        Labs personally reviewed      ASSESSMENT/PLAN: 	    62 F w/pmh SVT, anxiety and depression , thalassemia minor, p/w palpitations x 1 day         Problem/Plan - 1:  ·  Problem: Sustained SVT.   ·  Plan: initially w/ hemodynamic instability , now improved , currently in sinus , hemodynamically stable,    - telemetry   - TTE   - TSH (5.29)  - orthostatic vitals negative   - OP follow with EP for ablation eval    Problem/Plan - 2:  ·  Problem: Elevated troponin  ·  Plan: Likely increased demand in setting of SVT , currently asymptomatic , ekg w/ no acute ischemic changes   - telemetry -- now SR  - as1/ lipid   - CT angio heart to define anatomy    Problem/Plan - 3:  ·  Problem: Anxiety and depression.   ·  Plan: - continue Celexa   - continue Abilify   - continue Klonopin prn.    Problem/Plan - 4:  ·  Problem: H/O thalassemia minor.   ·  Plan: hgb 10 , at baseline per patient   - monitor H/H.      Problem/Plan - 5:  ·  Problem: Pericardial Effusion.   ·  Plan: Repeat limited TTE pending  - No s/sx of tamponade noted on POCUS        Roxy Grant, KIKA-NP   Antoine Noriega DO [facility]  Cardiovascular Medicine  [address] [facility], [address]  Available through call or text on [employer]  Office: [phone]   DATE OF SERVICE: 06-14-23 @ 13:16    Patient is a 62y old  Female who presents with a chief complaint of palpitations x 1 day (13 Jun 2023 20:50)      INTERVAL HISTORY: Feels much better.    REVIEW OF SYSTEMS:  CONSTITUTIONAL: No weakness  EYES/ENT: No visual changes;  No throat pain   NECK: No pain or stiffness  RESPIRATORY: No cough, wheezing; No shortness of breath  CARDIOVASCULAR: No chest pain or palpitations  GASTROINTESTINAL: No abdominal  pain. No nausea, vomiting, or hematemesis  GENITOURINARY: No dysuria, frequency or hematuria  NEUROLOGICAL: No stroke like symptoms  SKIN: No rashes    TELEMETRY Personally reviewed: SB/SR 40-80  	  MEDICATIONS:        PHYSICAL EXAM:  T(C): 36.7 (06-14-23 @ 12:18), Max: 37 (06-13-23 @ 18:15)  HR: 61 (06-14-23 @ 12:18) (54 - 80)  BP: 117/79 (06-14-23 @ 12:18) (87/58 - 190/101)  RR: 18 (06-14-23 @ 12:18) (16 - 20)  SpO2: 97% (06-14-23 @ 12:18) (97% - 100%)  Wt(kg): --  I&O's Summary    13 Jun 2023 07:01  -  14 Jun 2023 07:00  --------------------------------------------------------  IN: 150 mL / OUT: 1 mL / NET: 149 mL          Appearance: In no distress	  HEENT:    PERRL, EOMI	  Cardiovascular:  S1 S2, No JVD  Respiratory: Lungs clear to auscultation	  Gastrointestinal:  Soft, Non-tender, + BS	  Vascularature:  No edema of LE  Psychiatric: Appropriate affect   Neuro: no acute focal deficits                               9.9    5.34  )-----------( 239      ( 14 Jun 2023 06:50 )             30.3     06-14    140  |  103  |  18  ----------------------------<  87  4.0   |  24  |  0.88    Ca    9.0      14 Jun 2023 06:51    TPro  6.1  /  Alb  4.1  /  TBili  0.4  /  DBili  x   /  AST  20  /  ALT  20  /  AlkPhos  39<L>  06-14        Labs personally reviewed      ASSESSMENT/PLAN: 	    62 F w/pmh SVT, anxiety and depression , thalassemia minor, p/w palpitations x 1 day         Problem/Plan - 1:  ·  Problem: Sustained SVT.   ·  Plan: initially w/ hemodynamic instability , now improved , currently in sinus , hemodynamically stable,    - telemetry   - TTE   - TSH (5.29)  - orthostatic vitals negative   - OP follow with EP for ablation eval  - Start Metoprolol 25mg daily if HR tolerates     Problem/Plan - 2:  ·  Problem: Elevated troponin  ·  Plan: Likely increased demand in setting of SVT , currently asymptomatic , ekg w/ no acute ischemic changes   - telemetry -- now SR  - as1/ lipid   - CT angio heart to define anatomy once pericardial effusion better defined    Problem/Plan - 3:  ·  Problem: Anxiety and depression.   ·  Plan: - continue Celexa   - continue Abilify   - continue Klonopin prn.    Problem/Plan - 4:  ·  Problem: H/O thalassemia minor.   ·  Plan: hgb 10 , at baseline per patient   - monitor H/H.      Problem/Plan - 5:  ·  Problem: Pericardial Effusion.   ·  Plan: Repeat limited TTE pending  - No s/sx of tamponade noted on POCUS        KIKA Mcqueen-NP   Antoine Noriega DO [facility]  Cardiovascular Medicine  [address] [place] [address], [address]  Available through call or text on [employer]  Office: [phone]

## 2023-06-14 NOTE — PROGRESS NOTE ADULT - SUBJECTIVE AND OBJECTIVE BOX
Name of Patient : ARLENE LIVE  MRN: [id number]  Date of visit: 06-14-23 @ 15:07      Subjective: Patient seen and examined. No new events except as noted.   Patient seen earlier this AM. Sitting up in bed. Offers no new complaints.   Denies chest pain, palpitations, SOB or dyspnea. Denies lightheadedness or dizziness.    REVIEW OF SYSTEMS:    CONSTITUTIONAL: Generalized weakness   EYES/ENT: No visual changes;  No vertigo or throat pain   NECK: No pain or stiffness  RESPIRATORY: No cough, wheezing, hemoptysis; No shortness of breath  CARDIOVASCULAR: No chest pain or palpitations  GASTROINTESTINAL: No abdominal or epigastric pain. No nausea, vomiting, or hematemesis; No diarrhea or constipation. No melena or hematochezia.  GENITOURINARY: No dysuria, frequency or hematuria  NEUROLOGICAL: No numbness or weakness  SKIN: No itching, burning, rashes, or lesions   All other review of systems is negative unless indicated above.    MEDICATIONS:  MEDICATIONS  (STANDING):  ARIPiprazole 10 milliGRAM(s) Oral daily  chlorhexidine 2% Cloths 1 Application(s) Topical <User Schedule>  citalopram 40 milliGRAM(s) Oral daily      PHYSICAL EXAM:  T(C): 36.7 (06-14-23 @ 12:18), Max: 37 (06-13-23 @ 18:15)  HR: 61 (06-14-23 @ 12:18) (54 - 80)  BP: 117/79 (06-14-23 @ 12:18) (87/58 - 190/101)  RR: 18 (06-14-23 @ 12:18) (16 - 20)  SpO2: 97% (06-14-23 @ 12:18) (97% - 100%)  Wt(kg): --  I&O's Summary    13 Jun 2023 07:01  -  14 Jun 2023 07:00  --------------------------------------------------------  IN: 150 mL / OUT: 1 mL / NET: 149 mL          Appearance: Normal	  HEENT: Eyes are open   Lymphatic: No lymphadenopathy   Cardiovascular: Normal S1 S2, no JVD  Respiratory: normal effort , clear  Gastrointestinal:  Soft, Non-tender  Skin: No rashes,  warm to touch  Psychiatry:  Mood & affect appropriate  Musculoskeletal: No edema      06-13-23 @ 07:01  -  06-14-23 @ 07:00  --------------------------------------------------------  IN: 150 mL / OUT: 1 mL / NET: 149 mL                              9.9    5.34  )-----------( 239      ( 14 Jun 2023 06:50 )             30.3               06-14    140  |  103  |  18  ----------------------------<  87  4.0   |  24  |  0.88    Ca    9.0      14 Jun 2023 06:51    TPro  6.1  /  Alb  4.1  /  TBili  0.4  /  DBili  x   /  AST  20  /  ALT  20  /  AlkPhos  39<L>  06-14           CARDIAC MARKERS ( 13 Jun 2023 22:45 )  x     / x     / 64 U/L / x     / 3.5 ng/mL                    < from: POCUS ED TTE 2D F/U, Limited w/o Cont. (06.13.23 @ 23:37) >    IMPRESSION:  Moderate Pericardial Effusion. Plethoric IVC but no other direct evidence   of tamponade (no right atrial systolic collapse, no right ventricle   diastolic collapse).  Grossly preserved left ventricular systolic function.    < end of copied text >

## 2023-06-15 ENCOUNTER — TRANSCRIPTION ENCOUNTER (OUTPATIENT)
Age: 63
End: 2023-06-15

## 2023-06-15 VITALS
HEART RATE: 57 BPM | OXYGEN SATURATION: 100 % | DIASTOLIC BLOOD PRESSURE: 69 MMHG | SYSTOLIC BLOOD PRESSURE: 116 MMHG | TEMPERATURE: 98 F | RESPIRATION RATE: 18 BRPM

## 2023-06-15 LAB
ANION GAP SERPL CALC-SCNC: 11 MMOL/L — SIGNIFICANT CHANGE UP (ref 5–17)
BUN SERPL-MCNC: 17 MG/DL — SIGNIFICANT CHANGE UP (ref 7–23)
CALCIUM SERPL-MCNC: 8.5 MG/DL — SIGNIFICANT CHANGE UP (ref 8.4–10.5)
CHLORIDE SERPL-SCNC: 103 MMOL/L — SIGNIFICANT CHANGE UP (ref 96–108)
CO2 SERPL-SCNC: 23 MMOL/L — SIGNIFICANT CHANGE UP (ref 22–31)
CREAT SERPL-MCNC: 0.78 MG/DL — SIGNIFICANT CHANGE UP (ref 0.5–1.3)
EGFR: 86 ML/MIN/1.73M2 — SIGNIFICANT CHANGE UP
FERRITIN SERPL-MCNC: 26 NG/ML — SIGNIFICANT CHANGE UP (ref 15–150)
FOLATE SERPL-MCNC: 7.5 NG/ML — SIGNIFICANT CHANGE UP
GLUCOSE SERPL-MCNC: 86 MG/DL — SIGNIFICANT CHANGE UP (ref 70–99)
IRON SATN MFR SERPL: 16 % — SIGNIFICANT CHANGE UP (ref 14–50)
IRON SATN MFR SERPL: 39 UG/DL — SIGNIFICANT CHANGE UP (ref 30–160)
MAGNESIUM SERPL-MCNC: 2 MG/DL — SIGNIFICANT CHANGE UP (ref 1.6–2.6)
POTASSIUM SERPL-MCNC: 3.9 MMOL/L — SIGNIFICANT CHANGE UP (ref 3.5–5.3)
POTASSIUM SERPL-SCNC: 3.9 MMOL/L — SIGNIFICANT CHANGE UP (ref 3.5–5.3)
SODIUM SERPL-SCNC: 137 MMOL/L — SIGNIFICANT CHANGE UP (ref 135–145)
T4 FREE SERPL-MCNC: 1.3 NG/DL — SIGNIFICANT CHANGE UP (ref 0.9–1.8)
TIBC SERPL-MCNC: 244 UG/DL — SIGNIFICANT CHANGE UP (ref 220–430)
UIBC SERPL-MCNC: 205 UG/DL — SIGNIFICANT CHANGE UP (ref 110–370)
VIT B12 SERPL-MCNC: 437 PG/ML — SIGNIFICANT CHANGE UP (ref 232–1245)

## 2023-06-15 PROCEDURE — 71045 X-RAY EXAM CHEST 1 VIEW: CPT

## 2023-06-15 PROCEDURE — 82728 ASSAY OF FERRITIN: CPT

## 2023-06-15 PROCEDURE — 84439 ASSAY OF FREE THYROXINE: CPT

## 2023-06-15 PROCEDURE — 99285 EMERGENCY DEPT VISIT HI MDM: CPT

## 2023-06-15 PROCEDURE — 83036 HEMOGLOBIN GLYCOSYLATED A1C: CPT

## 2023-06-15 PROCEDURE — 87640 STAPH A DNA AMP PROBE: CPT

## 2023-06-15 PROCEDURE — 36415 COLL VENOUS BLD VENIPUNCTURE: CPT

## 2023-06-15 PROCEDURE — 80061 LIPID PANEL: CPT

## 2023-06-15 PROCEDURE — 80048 BASIC METABOLIC PNL TOTAL CA: CPT

## 2023-06-15 PROCEDURE — 87641 MR-STAPH DNA AMP PROBE: CPT

## 2023-06-15 PROCEDURE — 75574 CT ANGIO HRT W/3D IMAGE: CPT

## 2023-06-15 PROCEDURE — 75574 CT ANGIO HRT W/3D IMAGE: CPT | Mod: 26

## 2023-06-15 PROCEDURE — 84443 ASSAY THYROID STIM HORMONE: CPT

## 2023-06-15 PROCEDURE — 86803 HEPATITIS C AB TEST: CPT

## 2023-06-15 PROCEDURE — 80053 COMPREHEN METABOLIC PANEL: CPT

## 2023-06-15 PROCEDURE — 93308 TTE F-UP OR LMTD: CPT

## 2023-06-15 PROCEDURE — 82607 VITAMIN B-12: CPT

## 2023-06-15 PROCEDURE — 82746 ASSAY OF FOLIC ACID SERUM: CPT

## 2023-06-15 PROCEDURE — 85025 COMPLETE CBC W/AUTO DIFF WBC: CPT

## 2023-06-15 PROCEDURE — 83540 ASSAY OF IRON: CPT

## 2023-06-15 PROCEDURE — 82550 ASSAY OF CK (CPK): CPT

## 2023-06-15 PROCEDURE — 84484 ASSAY OF TROPONIN QUANT: CPT

## 2023-06-15 PROCEDURE — G0378: CPT

## 2023-06-15 PROCEDURE — 82553 CREATINE MB FRACTION: CPT

## 2023-06-15 PROCEDURE — 83550 IRON BINDING TEST: CPT

## 2023-06-15 PROCEDURE — 83735 ASSAY OF MAGNESIUM: CPT

## 2023-06-15 PROCEDURE — 93356 MYOCRD STRAIN IMG SPCKL TRCK: CPT

## 2023-06-15 PROCEDURE — 83880 ASSAY OF NATRIURETIC PEPTIDE: CPT

## 2023-06-15 PROCEDURE — 93306 TTE W/DOPPLER COMPLETE: CPT

## 2023-06-15 RX ADMIN — ARIPIPRAZOLE 10 MILLIGRAM(S): 15 TABLET ORAL at 14:19

## 2023-06-15 RX ADMIN — CITALOPRAM 40 MILLIGRAM(S): 10 TABLET, FILM COATED ORAL at 14:16

## 2023-06-15 RX ADMIN — CHLORHEXIDINE GLUCONATE 1 APPLICATION(S): 213 SOLUTION TOPICAL at 05:41

## 2023-06-15 NOTE — DISCHARGE NOTE NURSING/CASE MANAGEMENT/SOCIAL WORK - NSDCPNINST_GEN_ALL_CORE
Call and make follow up appointment with MD after discharged. Return to the nearest emergency room or call 911 for dizziness, chest pain, difficulty breathing or palpitations.

## 2023-06-15 NOTE — DISCHARGE NOTE NURSING/CASE MANAGEMENT/SOCIAL WORK - NSDCPEFALRISK_GEN_ALL_CORE
For information on Fall & Injury Prevention, visit: [email or web address] OR  [email or web address] OR  https://www.cdc.gov/steadi/patient.html

## 2023-06-15 NOTE — DISCHARGE NOTE PROVIDER - NSDCCPCAREPLAN_GEN_ALL_CORE_FT
PRINCIPAL DISCHARGE DIAGNOSIS  Diagnosis: SVT (supraventricular tachycardia)  Assessment and Plan of Treatment: You presented with palpitations and found to have SVT.   - telemetry monitored - with no further events  - Echo - Left ventricular systolic and diastolic  function is normal, EF 58 % No WMA,  - TSH (5.29)  - Blood pressure at different positins were normal  - Call your doctor if you feel your heart racing or beating unusually, chest tightness or pain, lightheaded, faint, shortness of breath especially with exercise  - Outpatient follow with cardiology and Electrophysiology for ablation evaluation  - May consider Metoprolol 25mg daily if heart rate and blood pressure tolerates as outpatient with discussion with cardiologist - Dr. Moreno        SECONDARY DISCHARGE DIAGNOSES  Diagnosis: Pericardial effusion  Assessment and Plan of Treatment: You have some fluid around your heart   -Echo showed Small pericardial effusion adjacent to the anterior right ventricle and a more loculated effusion around the base of the right ventricle and right atrium.  Follow up with cardiologist - Dr. Moreno in 1 - 3 days    Diagnosis: Elevated troponin  Assessment and Plan of Treatment: Blood work was concerning for heart muscle injury  - Likely due increased demand due to fast heart rate/SVT, currently asymptomatic and denies chest pain  - EKG shows no concern for heart attack  - telemetry - now normal rhythm  - HbA1c 4.8 / lipid panel normal  - CT Angio of heart and arteries showed no occlusion      Diagnosis: Anxiety and depression  Assessment and Plan of Treatment: Continue Celexa   - continue Abilify   - continue Klonopin prn.  Follow up with physician that was previously established    Diagnosis: H/O thalassemia minor  Assessment and Plan of Treatment: - Hgb 10 , at baseline per patient   - monitor H/H.

## 2023-06-15 NOTE — DISCHARGE NOTE PROVIDER - NSDCFUADDAPPT_GEN_ALL_CORE_FT
APPTS ARE READY TO BE MADE: [ ] YES    Best Family or Patient Contact (if needed):    Additional Information about above appointments (if needed):    1: Dr. Moreno in 1-3 days  2:   3:     Other comments or requests:

## 2023-06-15 NOTE — PROGRESS NOTE ADULT - SUBJECTIVE AND OBJECTIVE BOX
Name of Patient : ARLENE LIVE  MRN: [id number]      Subjective: Patient seen and examined. No new events except as noted.   Doing okay   CT coronary noted     REVIEW OF SYSTEMS:    CONSTITUTIONAL: No weakness, fevers or chills  EYES/ENT: No visual changes;  No vertigo or throat pain   NECK: No pain or stiffness  RESPIRATORY: No cough, wheezing, hemoptysis; No shortness of breath  CARDIOVASCULAR: No chest pain or palpitations  GASTROINTESTINAL: No abdominal or epigastric pain. No nausea, vomiting, or hematemesis; No diarrhea or constipation. No melena or hematochezia.  GENITOURINARY: No dysuria, frequency or hematuria  NEUROLOGICAL: No numbness or weakness  SKIN: No itching, burning, rashes, or lesions   All other review of systems is negative unless indicated above.    MEDICATIONS:  MEDICATIONS  (STANDING):  ARIPiprazole 10 milliGRAM(s) Oral daily  chlorhexidine 2% Cloths 1 Application(s) Topical <User Schedule>  citalopram 40 milliGRAM(s) Oral daily      PHYSICAL EXAM:  T(C): 36.6 (06-15-23 @ 15:30), Max: 36.9 (06-15-23 @ 13:07)  HR: 57 (06-15-23 @ 15:30) (56 - 60)  BP: 116/69 (06-15-23 @ 15:30) (97/61 - 122/81)  RR: 18 (06-15-23 @ 15:30) (18 - 18)  SpO2: 100% (06-15-23 @ 15:30) (98% - 100%)  Wt(kg): --  I&O's Summary      Appearance: Normal	  HEENT:  PERRLA   Lymphatic: No lymphadenopathy   Cardiovascular: Normal S1 S2, no JVD  Respiratory: normal effort , clear  Gastrointestinal:  Soft, Non-tender  Skin: No rashes,  warm to touch  Psychiatry:  Mood & affect appropriate  Musculuskeletal: No edema    recent labs, Imaging and EKGs personally reviewed                           9.9    5.34  )-----------( 239      ( 14 Jun 2023 06:50 )             30.3               06-15    137  |  103  |  17  ----------------------------<  86  3.9   |  23  |  0.78    Ca    8.5      15 Anastaico 2023 07:07  Mg     2.0     06-15    TPro  6.1  /  Alb  4.1  /  TBili  0.4  /  DBili  x   /  AST  20  /  ALT  20  /  AlkPhos  39<L>  06-14

## 2023-06-15 NOTE — DISCHARGE NOTE PROVIDER - HOSPITAL COURSE
62 F w/pmh SVT, anxiety and depression , thalassemia minor, p/w palpitations x 1 day       #Sustained SVT.   - Initially w/ hemodynamic instability , now improved , currently in sinus , hemodynamically stable,    - telemetry monitored - with no further events  - Echo - Left ventricular systolic and diastolic  function is normal, EF 58 % No WMA,  Small pericardial effusion adjacent to the anterior right ventricle and a more loculated effusion around the base of the right ventricle and right atrium.  - TSH (5.29)  - orthostatic vitals negative   - OP follow with EP for ablation eval  - Start Metoprolol 25mg daily if HR tolerates    #Pericardial Effusion.   - Pericardial Effusion noted on POCUS, No s/sx of tamponade   - Small pericardial effusion adjacent to the anterior right ventricle and a more loculated effusion around the base of the right ventricle and right atrium.    #Elevated troponin  - Likely increased demand in setting of SVT , currently asymptomatic   - EKG w/ no acute ischemic changes   - telemetry - now SR  - HbA1c 4.8 / lipid panel normal  - CT angio heart to define anatomy once pericardial effusion better defined    # Anxiety and depression.   - Continue Celexa   - continue Abilify   - continue Klonopin prn.    # H/O thalassemia minor.   - Hgb 10 , at baseline per patient   - monitor H/H.     62 F w/pmh SVT, anxiety and depression , thalassemia minor, p/w palpitations x 1 day       #Sustained SVT.   - Initially w/ hemodynamic instability , now improved , currently in sinus , hemodynamically stable,    - telemetry monitored - with no further events  - Echo - Left ventricular systolic and diastolic  function is normal, EF 58 % No WMA,  Small pericardial effusion adjacent to the anterior right ventricle and a more loculated effusion around the base of the right ventricle and right atrium.  - TSH (5.29)  - orthostatic vitals negative   - OP follow with EP for ablation eval  - Continue Propanolol PRN palps    #Pericardial Effusion.   - Pericardial Effusion noted on POCUS, No s/sx of tamponade   - Small pericardial effusion adjacent to the anterior right ventricle and a more loculated effusion around the base of the right ventricle and right atrium.  - Discussed with pt - FU with Dr Moreno for serial echo to assess for resolution or progression of effusion     #Elevated troponin  - Likely increased demand in setting of SVT , currently asymptomatic   - EKG w/ no acute ischemic changes   - telemetry - now SR  - HbA1c 4.8 / lipid panel normal  - CT angio heart with no CAD    # Anxiety and depression.   - Continue Celexa   - continue Abilify   - continue Klonopin prn.    # H/O thalassemia minor.   - Hgb 10 , at baseline per patient   - monitor H/H.    Forty five minutes spent on encounter, of which more than fifty percent of the encounter was spent on counseling and/or coordinating care by the attending physician on discharge  planning

## 2023-06-15 NOTE — DISCHARGE NOTE PROVIDER - NSDCMRMEDTOKEN_GEN_ALL_CORE_FT
Abilify 10 mg oral tablet: 1 orally once a day  CeleXA 40 mg oral tablet: 1 orally once a day  KlonoPIN 0.5 mg oral tablet: 1 orally 2 times a day as needed for  anxiety  propranolol 20 mg oral tablet: 1 orally as needed for palpitations   Abilify 10 mg oral tablet: 1 orally once a day  CeleXA 40 mg oral tablet: 1 orally once a day  KlonoPIN 0.5 mg oral tablet: 1 orally 2 times a day as needed for  anxiety  propranolol 20 mg oral tablet: 1 tablet orally once a day as needed for palpitations

## 2023-06-15 NOTE — DISCHARGE NOTE NURSING/CASE MANAGEMENT/SOCIAL WORK - PATIENT PORTAL LINK FT
You can access the FollowMyHealth Patient Portal offered by [facility] by registering at the following website: [email or web address]. By joining [employer]’s FollowMyHealth portal, you will also be able to view your health information using other applications (apps) compatible with our system.

## 2023-06-15 NOTE — DISCHARGE NOTE PROVIDER - NSDCCPTREATMENT_GEN_ALL_CORE_FT
PRINCIPAL PROCEDURE  Procedure: CTA coronary arteries  Findings and Treatment:   Coronary CTA: Right coronary artery dominance. No significant moderate to   severe category coronary artery stenosis. Scattered minimal stenosis   secondary to noncalcified plaque.  Small dependent pericardial effusion.  Stenosis Reference Ranges:  Minimal <25%  Mild 25-49%  Moderate 50-69%  Severe 70-99%  Occluded >99%      SECONDARY PROCEDURE  Procedure: Transthoracic echo  Findings and Treatment: Left Ventricle:  Normal left ventricular cavity size. The left ventricular wall thickness is normal. The left ventricular systolic function is normal with a calculated ejection fraction of 58 % by the Manuel's biplane method of disks. There are no regional wall motion abnormalities seen. There is normal left ventricular diastolic function, with normal filling pressure. Global longitudinal strain is -25.1 % (normal < -18%). GLS was assessed on [id number] echo machine with a heart rate of 56 bpm and a blood pressure of 110/72 mmHg.     Right Ventricle:  Normal right ventricular cavity size, normal wall thickness and normal systolic function. Tricuspid annular plane systolic excursion (TAPSE) is 2.2 cm (normal >=1.7 cm).     Left Atrium:  The left atrium is normal.     Right Atrium:  The right atrium is dilated with an indexed volume of 33.78 ml/m².     Aortic Valve:  The aortic valve appears trileaflet with normal systolic excursion. There is no aortic stenosis. There is no evidence of aortic regurgitation.     Mitral Valve:  Structurally normal mitral valve with normal leaflet excursion. There is trace mitral regurgitation.     Tricuspid Valve:  Structurally normal tricuspid valve with normal leaflet excursion. There is mild tricuspid regurgitation. Estimated pulmonary artery systolic pressure is 28 mmHg.     Pulmonic Valve:  Structurally normal pulmonic valve with normal leaflet excursion. There is trace pulmonic regurgitation.     Aorta:  The aortic annulus and aortic root appear normal in size. Normal aorta sinus of Valsalva, measuring 3.20 cm (indexed 1.97 cm/m²).     Pericardium:  There is a small pericardial effusion noted adjacent to the anterior right ventricle. There is a more loculated effusion around the base of the right ventricle and right atrium.     Systemic Veins:  The inferior vena cava is dilated (dilated >2.1cm) with abnormal inspiratory collapse (abnormal <50%) consistent with elevated right atrial pressure (~15, range 10-20mmHg).

## 2023-06-15 NOTE — DISCHARGE NOTE PROVIDER - CARE PROVIDER_API CALL
Jose Maria Moreno  Cardiovascular Disease  [address] [facility] [address]  [place] [address]  Phone: [phone]  Fax: [phone]  Follow Up Time: 1-3 days

## 2023-06-15 NOTE — PROGRESS NOTE ADULT - ASSESSMENT
62 F w/pmh SVT, anxiety and depression , thalassemia minor, p/w palpitations x 1 day         Sustained SVT.   - Initially w/ hemodynamic instability , now improved , currently in sinus , hemodynamically stable, mild troponin elevated , more likely 2/2 increased demand rather than ischemic event    - Monitor on telemetry   - Pocus TTE with Moderate Pericardial Effusion, Plethoric IVC, Preserved LVSF; Official TTE pending --> F/u Cardio recs   - TSH elevated, check FT4   - Orthostatic vitals negative   - CT Coronaries pending   - Cardiology consult appreciated; F/u recs   - Per cardio, outpatient follow up with EP for ablation     Cardiac enzymes elevated.   - Increased demand in setting of SVT , currently asymptomatic , ekg w/ no acute ischemic changes   - telemetry   - C/w ASA and statin    - Cardiac enzymes, now down-trending   -  F/u cardiology recs   - Ct conary negative     Pericardial Effusion.   - Pocus TTE with Moderate Pericardial Effusion, Plethoric IVC, Preserved LVSF  - TTE noted, mild pleural effusion   - No s/sx of tamponade noted on POCUS    Anxiety and depression.   - continue Celexa   - continue Abilify   - continue Klonopin prn.    H/O thalassemia minor.   - Hgb 10 , at baseline per patient   - monitor H/H.    
62 F w/pmh SVT, anxiety and depression , thalassemia minor, p/w palpitations x 1 day         Sustained SVT.   - Initially w/ hemodynamic instability , now improved , currently in sinus , hemodynamically stable, mild troponin elevated , more likely 2/2 increased demand rather than ischemic event    - Monitor on telemetry   - Pocus TTE with Moderate Pericardial Effusion, Plethoric IVC, Preserved LVSF; Official TTE pending --> F/u Cardio recs   - TSH elevated, check FT4   - Orthostatic vitals negative   - CT Coronaries pending   - Cardiology consult appreciated; F/u recs   - Per cardio, outpatient follow up with EP for ablation     Cardiac enzymes elevated.   - Increased demand in setting of SVT , currently asymptomatic , ekg w/ no acute ischemic changes   - telemetry   - C/w ASA and statin    - Cardiac enzymes, now down-trending   -  F/u cardiology recs     Pericardial Effusion.   - Pocus TTE with Moderate Pericardial Effusion, Plethoric IVC, Preserved LVSF  - Repeat TTE pending  - No s/sx of tamponade noted on POCUS    Anxiety and depression.   - continue Celexa   - continue Abilify   - continue Klonopin prn.    H/O thalassemia minor.   - Hgb 10 , at baseline per patient   - monitor H/H.    Discussed with Attending and ACP.

## 2023-06-19 ENCOUNTER — TRANSCRIPTION ENCOUNTER (OUTPATIENT)
Age: 63
End: 2023-06-19

## 2023-06-19 RX ORDER — CITALOPRAM 10 MG/1
1 TABLET, FILM COATED ORAL
Refills: 0 | DISCHARGE

## 2023-06-19 RX ORDER — CLONAZEPAM 1 MG
1 TABLET ORAL
Refills: 0 | DISCHARGE

## 2023-06-19 RX ORDER — ARIPIPRAZOLE 15 MG/1
1 TABLET ORAL
Refills: 0 | DISCHARGE

## 2023-06-19 RX ORDER — PROPRANOLOL HCL 160 MG
1 CAPSULE, EXTENDED RELEASE 24HR ORAL
Qty: 0 | Refills: 0 | DISCHARGE

## 2023-09-26 ENCOUNTER — APPOINTMENT (OUTPATIENT)
Dept: UROGYNECOLOGY | Facility: CLINIC | Age: 63
End: 2023-09-26

## 2024-06-17 NOTE — DISCHARGE NOTE PROVIDER - NSCORESITESY/N_GEN_A_CORE_RD
Problem: Discharge Planning  Goal: Discharge to home or other facility with appropriate resources  Outcome: Progressing  Flowsheets (Taken 6/16/2024 0800)  Discharge to home or other facility with appropriate resources: Identify barriers to discharge with patient and caregiver     Problem: Safety - Adult  Goal: Free from fall injury  Outcome: Progressing  Flowsheets (Taken 6/16/2024 0800)  Free From Fall Injury: Instruct family/caregiver on patient safety     Problem: Chronic Conditions and Co-morbidities  Goal: Patient's chronic conditions and co-morbidity symptoms are monitored and maintained or improved  Outcome: Progressing  Flowsheets (Taken 6/16/2024 0800)  Care Plan - Patient's Chronic Conditions and Co-Morbidity Symptoms are Monitored and Maintained or Improved: Monitor and assess patient's chronic conditions and comorbid symptoms for stability, deterioration, or improvement      No

## 2025-02-28 PROBLEM — Z86.2 PERSONAL HISTORY OF DISEASES OF THE BLOOD AND BLOOD-FORMING ORGANS AND CERTAIN DISORDERS INVOLVING THE IMMUNE MECHANISM: Chronic | Status: ACTIVE | Noted: 2023-06-13

## 2025-02-28 PROBLEM — I47.1 SUPRAVENTRICULAR TACHYCARDIA: Chronic | Status: ACTIVE | Noted: 2023-06-13

## 2025-02-28 PROBLEM — F41.9 ANXIETY DISORDER, UNSPECIFIED: Chronic | Status: ACTIVE | Noted: 2023-06-13

## 2025-03-04 ENCOUNTER — APPOINTMENT (OUTPATIENT)
Dept: MRI IMAGING | Facility: CLINIC | Age: 65
End: 2025-03-04
Payer: COMMERCIAL

## 2025-03-04 ENCOUNTER — OUTPATIENT (OUTPATIENT)
Dept: OUTPATIENT SERVICES | Facility: HOSPITAL | Age: 65
LOS: 1 days | End: 2025-03-04
Payer: COMMERCIAL

## 2025-03-04 DIAGNOSIS — Z98.890 OTHER SPECIFIED POSTPROCEDURAL STATES: Chronic | ICD-10-CM

## 2025-03-04 DIAGNOSIS — D48.2 NEOPLASM OF UNCERTAIN BEHAVIOR OF PERIPHERAL NERVES AND AUTONOMIC NERVOUS SYSTEM: ICD-10-CM

## 2025-03-04 DIAGNOSIS — Z90.710 ACQUIRED ABSENCE OF BOTH CERVIX AND UTERUS: Chronic | ICD-10-CM

## 2025-03-04 PROCEDURE — 70553 MRI BRAIN STEM W/O & W/DYE: CPT | Mod: 26

## 2025-03-04 PROCEDURE — 70553 MRI BRAIN STEM W/O & W/DYE: CPT

## 2025-03-04 PROCEDURE — A9585: CPT
